# Patient Record
(demographics unavailable — no encounter records)

---

## 2025-06-25 NOTE — CONSULT LETTER
[FreeTextEntry1] : Dear Dr. JUSTINO SANTILLAN  ,   I had the pleasure of evaluating your patient, JOSE TOVAR .  Thank you very much for allowing me to participate in the care of this patient. If you have any questions, please do not hesitate to contact me.   Sincerely,  Alexa Banks MD   ABPMR Board Certified in Physical Medicine and Rehabilitation Certified Fellow of AANEM (Neuromuscular and Electrodiagnostic Medicine) Subspecialty certified in Sports Medicine (ABPMR)   of Physical Medicine and Rehabilitation [facility] [employer] [facility]

## 2025-06-25 NOTE — PHYSICAL EXAM
[FreeTextEntry1] : PHYSICAL EXAM : OBJECTIVE   GENERAL : Awake ,alert and oriented to time place and person  HEAD : normocephalic and atraumatic  NECK : supple ,no tracheal deviation ,no thyroid enlargement noted with swallowing EYES : sclera and conjunctiva normal no redness,intact extraocular movements  ENT  : ears and nose normal in appearance -hearing adequate  PULMONARY: effort normal. No respiratory distress. breathing regular. No wheezes  LYMPH : No swelling in limbs, capillary return within normal range  CVS : warm extremities,no palpitations,not short of breath, no visible jugular venous distention PSYCH : mood and affect normal ,good eye contact ,normal attention  ABDOMEN : no visible distension ,  NEUROLOGICAL:cranial nerves intact,muscle tone normal,gait and balance safe except where noted below  SKIN : warm and dry No rash detected over specific body areas examined  MUSCULOSKELETAL: normal muscle bulk, no focal bony tenderness /posture normal except where specified below  no effusion  ROM full  ROM minus 10 to 90  gait nl  maybe mild genu varus left  No long tract signs found on clinical exam and no focal neurological deficits can do deep squat

## 2025-06-25 NOTE — ASSESSMENT
[FreeTextEntry1] :   PLAN AND RECOMMENDATIONS :   We discussed differential diagnosis and clinical impression  To help the patient understand their condition a plastic 3D model of the knee joint  was used to better explain. The patient was given handouts to read on this condition,helpful hints ,exercises etc  all questions answered Recommend .symptomatic care and support  medications advise prescription NSAIDS for both pain and anti inflammation  to help with healing / calming the soft tissue and reducing swelling- for at least 2 weeks strict -diclofenac topical  BID after food -warned of possible GI side effects -advised to take with meals or add over the counter Nexium, if sensitive- if GI upset (nausea, vomiting, heartburn) becomes noticeable -stop medication and notify the office.     imaging will get xrays sunrise views    hydrotherapy /heat / cold for pain  continue avoid deep sitting   relative rest and avoidance of painful activity where possible  increasing activity as discussed  return for follow up. 2 months  will do the exercises as he is going away over summer will follow my sheet  also shown type of brace to order

## 2025-06-25 NOTE — REVIEW OF SYSTEMS
[Patient Intake Form Reviewed] : Patient intake form was reviewed [Joint Pain] : joint pain [Joint Stiffness] : joint stiffness [Fever] : no fever [Difficulty Walking] : no difficulty walking

## 2025-06-25 NOTE — HISTORY OF PRESENT ILLNESS
[FreeTextEntry1] :  Mr. JOSE TOVAR is a very pleasant 48-year male who comes in for evaluation of pain in both knees that has been ongoing for 2-4 months  without any specific injury or inciting event. The pain is located primarily middle of both knees intermittent in nature and described as dull, achy. The pain is rated as 0/10 during today's visit, and ranges from 3~/1010. The patient's symptoms are aggravated by moving and alleviated by rest. The patient denies any night pain, numbness/tingling, weakness, or bowel/bladder dysfunction. The patient has no other complaints at this time.   Had PT in the past for knee pain but symptoms different  No imaging was done  Last time I saw patient was 03/14/2023 for neck pain in which problem has resolved  his wife has seen me also  pain worst with prolonged walking or biking